# Patient Record
Sex: FEMALE | Race: WHITE
[De-identification: names, ages, dates, MRNs, and addresses within clinical notes are randomized per-mention and may not be internally consistent; named-entity substitution may affect disease eponyms.]

---

## 2023-09-06 PROBLEM — Z00.00 ENCOUNTER FOR PREVENTIVE HEALTH EXAMINATION: Status: ACTIVE | Noted: 2023-09-06

## 2023-09-27 ENCOUNTER — APPOINTMENT (OUTPATIENT)
Dept: ORTHOPEDIC SURGERY | Facility: CLINIC | Age: 68
End: 2023-09-27
Payer: MEDICARE

## 2023-09-27 PROCEDURE — 20610 DRAIN/INJ JOINT/BURSA W/O US: CPT | Mod: RT

## 2023-09-27 PROCEDURE — 73562 X-RAY EXAM OF KNEE 3: CPT | Mod: RT

## 2023-09-27 PROCEDURE — 99203 OFFICE O/P NEW LOW 30 MIN: CPT | Mod: 25

## 2023-09-27 RX ORDER — MELOXICAM 15 MG/1
15 TABLET ORAL
Qty: 30 | Refills: 2 | Status: ACTIVE | COMMUNITY
Start: 2023-09-27 | End: 1900-01-01

## 2023-11-27 ENCOUNTER — APPOINTMENT (OUTPATIENT)
Dept: ORTHOPEDIC SURGERY | Facility: CLINIC | Age: 68
End: 2023-11-27
Payer: MEDICARE

## 2023-11-27 PROCEDURE — 99213 OFFICE O/P EST LOW 20 MIN: CPT | Mod: 25

## 2023-11-27 PROCEDURE — 20610 DRAIN/INJ JOINT/BURSA W/O US: CPT

## 2024-01-11 ENCOUNTER — APPOINTMENT (OUTPATIENT)
Dept: ORTHOPEDIC SURGERY | Facility: CLINIC | Age: 69
End: 2024-01-11
Payer: MEDICARE

## 2024-01-11 PROCEDURE — 20610 DRAIN/INJ JOINT/BURSA W/O US: CPT | Mod: 50

## 2024-01-11 PROCEDURE — 99213 OFFICE O/P EST LOW 20 MIN: CPT | Mod: 25

## 2024-01-11 NOTE — ASSESSMENT
[FreeTextEntry1] : I  discussed with patient today the  option of a  Visco supplement injection to both knees.  risks and benefits were  reviewed a  consent was  given,  with sterile technique through a lateral approach the gel injection was delivered and  tolerated well. a Band-Aid was applied  I will see her back in a few months she can continue with the Mobic light exercise

## 2024-01-11 NOTE — IMAGING
[de-identified] : Pleasant mildly overweight limp swelling right more than left calf soft limits in flexion  X-rays right knee mild degenerative change

## 2024-01-11 NOTE — HISTORY OF PRESENT ILLNESS
[de-identified] : 68-year-old with  knee pain little on the left mostly the right difficulty getting out of a seated position difficulty with stairs no trauma pain is 7 out of 10 no treatment no x-rays no allergies does not smoke

## 2024-01-18 ENCOUNTER — APPOINTMENT (OUTPATIENT)
Dept: ORTHOPEDIC SURGERY | Facility: CLINIC | Age: 69
End: 2024-01-18
Payer: MEDICARE

## 2024-01-18 PROCEDURE — 99213 OFFICE O/P EST LOW 20 MIN: CPT

## 2024-01-18 NOTE — HISTORY OF PRESENT ILLNESS
[de-identified] : 68-year-old female comes in today with her  for an evaluation of her knees.  Patient had Synvisc 1 injections with Dr. Bridges last week.  She has been noticing swelling in the left knee, she was concerned so came in today for a visit.  She states that she is not taking the meloxicam that was prescribed.

## 2024-01-18 NOTE — ASSESSMENT
[FreeTextEntry1] : At this time there was no large knee effusion that needed to be aspirated at this time.  We discussed icing and/or warm compresses.  She was inquiring about topical medication, I recommended Voltaren gel.  I recommend that she start the meloxicam, has a scheduled follow-up visit with Dr. Bridges in 2 weeks.

## 2024-01-18 NOTE — IMAGING
[de-identified] : On examination of the right knee no swelling, no ecchymosis, no erythema.  Skin is intact.  Examination of the left knee very mild swelling medially, no large knee effusion, no warmth, no erythema.  Skin is intact.  Really good motion through knee flexion and extension bilaterally.  Calves are soft nontender.

## 2024-02-05 ENCOUNTER — APPOINTMENT (OUTPATIENT)
Dept: ORTHOPEDIC SURGERY | Facility: CLINIC | Age: 69
End: 2024-02-05
Payer: MEDICARE

## 2024-02-05 PROCEDURE — 20610 DRAIN/INJ JOINT/BURSA W/O US: CPT | Mod: RT

## 2024-02-05 PROCEDURE — 73560 X-RAY EXAM OF KNEE 1 OR 2: CPT | Mod: LT

## 2024-02-05 PROCEDURE — 99213 OFFICE O/P EST LOW 20 MIN: CPT | Mod: 25

## 2024-02-05 NOTE — ASSESSMENT
[FreeTextEntry1] : Attempted to aspirate the left knee with sterile technique thanks about 5 cc of serous fluid did place cortisone in the knee tolerated it well continue on the anti-inflammatory I will see her back in a few months at this time there was no indication to attempt aspiration of the right knee Explained that repeating the gel injection would not be indicated to either knee She expressed no interest in a surgical consideration

## 2024-02-05 NOTE — IMAGING
[de-identified] : On examination of the right knee no swelling, no ecchymosis, no erythema.  Skin is intact.  Examination of the left knee very mild swelling medially, no large knee effusion, no warmth, no erythema.  Skin is intact.  Really good motion through knee flexion and extension bilaterally.  Calves are soft nontender. Left knee small effusion  X-rays left knee minimal degenerative change

## 2024-02-05 NOTE — REASON FOR VISIT
[Spouse] : spouse [FreeTextEntry2] : Patient is here for bilateral knee. L>R pain  Had gel injections does not think they worked much has been using Mobic difficulty with stairs Using Voltaren gel

## 2024-02-05 NOTE — HISTORY OF PRESENT ILLNESS
[de-identified] : 68-year-old female comes in today with her  for an evaluation of her knees.  Patient had Synvisc 1 injections with Dr. Bridges last week.  She has been noticing swelling in the left knee, she was concerned so came in today for a visit.  She states that she is not taking the meloxicam that was prescribed.

## 2024-03-18 ENCOUNTER — APPOINTMENT (OUTPATIENT)
Dept: ORTHOPEDIC SURGERY | Facility: CLINIC | Age: 69
End: 2024-03-18
Payer: MEDICARE

## 2024-03-18 DIAGNOSIS — M17.11 UNILATERAL PRIMARY OSTEOARTHRITIS, RIGHT KNEE: ICD-10-CM

## 2024-03-18 DIAGNOSIS — M17.12 UNILATERAL PRIMARY OSTEOARTHRITIS, LEFT KNEE: ICD-10-CM

## 2024-03-18 PROCEDURE — 99213 OFFICE O/P EST LOW 20 MIN: CPT

## 2024-03-18 NOTE — HISTORY OF PRESENT ILLNESS
[de-identified] : 68-year-old female comes in today with her  for an evaluation of her knees.  Patient had Synvisc 1 injections with Dr. Bridges last week.  She has been noticing swelling in the left knee, she was concerned so came in today for a visit.  She states that she is not taking the meloxicam that was prescribed.

## 2024-03-18 NOTE — IMAGING
[de-identified] : On examination of the right knee no swelling, no ecchymosis, no erythema.  Skin is intact.  Examination of the left knee very mild swelling medially, no large knee effusion, no warmth, no erythema.  Skin is intact.  Really good motion through knee flexion and extension bilaterally.  Calves are soft nontender. Left knee small effusion  X-rays left knee minimal degenerative change

## 2024-03-18 NOTE — REASON FOR VISIT
[Spouse] : spouse [FreeTextEntry2] : Patient is here for bilateral knee. L>R pain Did recently fall on the right knee on carpet had to stop the Mobic it was bothering her stomach does really report that the gel did finally start to help both knees

## 2024-03-18 NOTE — ASSESSMENT
[FreeTextEntry1] : Would consider the gel was successful took a few months to improve I will see her back in July we would consider repeating the gel in both knees

## 2024-04-04 ENCOUNTER — APPOINTMENT (OUTPATIENT)
Dept: ORTHOPEDIC SURGERY | Facility: CLINIC | Age: 69
End: 2024-04-04

## 2024-07-10 ENCOUNTER — APPOINTMENT (OUTPATIENT)
Dept: ORTHOPEDIC SURGERY | Facility: CLINIC | Age: 69
End: 2024-07-10